# Patient Record
Sex: FEMALE | Race: WHITE | NOT HISPANIC OR LATINO | Employment: OTHER | URBAN - METROPOLITAN AREA
[De-identification: names, ages, dates, MRNs, and addresses within clinical notes are randomized per-mention and may not be internally consistent; named-entity substitution may affect disease eponyms.]

---

## 2023-02-04 PROBLEM — G89.29 CHRONIC RIGHT SHOULDER PAIN: Status: ACTIVE | Noted: 2023-02-04

## 2023-02-04 PROBLEM — R30.0 DYSURIA: Status: ACTIVE | Noted: 2023-02-04

## 2023-02-04 PROBLEM — F43.9 STRESS AT HOME: Status: ACTIVE | Noted: 2023-02-04

## 2023-02-04 PROBLEM — N20.0 KIDNEY STONE ON RIGHT SIDE: Status: ACTIVE | Noted: 2023-02-04

## 2023-02-04 PROBLEM — R35.0 URINARY FREQUENCY: Status: ACTIVE | Noted: 2023-02-04

## 2023-02-04 PROBLEM — H40.9 GLAUCOMA: Status: ACTIVE | Noted: 2023-02-04

## 2023-02-04 PROBLEM — E78.00 PURE HYPERCHOLESTEROLEMIA: Status: ACTIVE | Noted: 2023-02-04

## 2023-02-04 PROBLEM — E55.9 VITAMIN D DEFICIENCY: Status: ACTIVE | Noted: 2023-02-04

## 2023-02-04 PROBLEM — K59.00 CONSTIPATION: Status: ACTIVE | Noted: 2023-02-04

## 2023-02-04 PROBLEM — I10 HTN (HYPERTENSION): Status: ACTIVE | Noted: 2023-02-04

## 2023-02-04 PROBLEM — M25.551 RIGHT HIP PAIN: Status: ACTIVE | Noted: 2023-02-04

## 2023-02-04 PROBLEM — M19.90 ARTHRITIS: Status: ACTIVE | Noted: 2023-02-04

## 2023-02-04 PROBLEM — R53.1 WEAKNESS GENERALIZED: Status: ACTIVE | Noted: 2023-02-04

## 2023-02-04 PROBLEM — N13.30 HYDRONEPHROSIS OF RIGHT KIDNEY: Status: ACTIVE | Noted: 2023-02-04

## 2023-02-04 PROBLEM — M18.12 OSTEOARTHRITIS OF LEFT THUMB: Status: ACTIVE | Noted: 2023-02-04

## 2023-02-04 PROBLEM — E61.1 IRON DEFICIENCY: Status: ACTIVE | Noted: 2023-02-04

## 2023-02-04 PROBLEM — M25.511 CHRONIC RIGHT SHOULDER PAIN: Status: ACTIVE | Noted: 2023-02-04

## 2023-02-04 RX ORDER — MULTIVIT-MIN/FA/LYCOPEN/LUTEIN .4-300-25
1 TABLET ORAL DAILY
COMMUNITY

## 2023-02-04 RX ORDER — LATANOPROST 50 UG/ML
SOLUTION/ DROPS OPHTHALMIC
COMMUNITY
Start: 2018-04-26

## 2023-02-04 RX ORDER — GLUCOSAMINE/CHONDR SU A SOD 750-600 MG
TABLET ORAL
COMMUNITY

## 2023-02-04 RX ORDER — DOCUSATE SODIUM 100 MG/1
100 CAPSULE, LIQUID FILLED ORAL DAILY
COMMUNITY
Start: 2022-09-20

## 2023-02-04 RX ORDER — POLYETHYLENE GLYCOL 3350 17 G/17G
17 POWDER, FOR SOLUTION ORAL
COMMUNITY
Start: 2022-10-04

## 2023-02-04 RX ORDER — TIMOLOL MALEATE 5 MG/ML
1 SOLUTION/ DROPS OPHTHALMIC 2 TIMES DAILY
COMMUNITY
Start: 2021-06-23

## 2023-02-04 RX ORDER — AMLODIPINE BESYLATE 5 MG/1
5 TABLET ORAL DAILY
COMMUNITY
Start: 2019-02-13 | End: 2023-03-29 | Stop reason: SDUPTHER

## 2023-02-04 RX ORDER — CHOLECALCIFEROL (VITAMIN D3) 50 MCG
50 TABLET ORAL DAILY
COMMUNITY

## 2023-02-04 RX ORDER — LYSINE HCL 500 MG
TABLET ORAL
COMMUNITY

## 2023-02-04 RX ORDER — FERROUS SULFATE 325(65) MG
TABLET ORAL
COMMUNITY

## 2023-03-29 ENCOUNTER — TELEPHONE (OUTPATIENT)
Dept: PRIMARY CARE | Facility: CLINIC | Age: 83
End: 2023-03-29

## 2023-03-29 DIAGNOSIS — I10 PRIMARY HYPERTENSION: Primary | ICD-10-CM

## 2023-03-29 RX ORDER — AMLODIPINE BESYLATE 5 MG/1
5 TABLET ORAL DAILY
Qty: 90 TABLET | Refills: 3 | Status: SHIPPED | OUTPATIENT
Start: 2023-03-29 | End: 2023-04-17 | Stop reason: SDUPTHER

## 2023-03-29 NOTE — TELEPHONE ENCOUNTER
Patient requesting a 90 day refill of amlodipine besylate 5 mg sent to [facility] pharmacy on file.

## 2023-04-17 ENCOUNTER — OFFICE VISIT (OUTPATIENT)
Dept: PRIMARY CARE | Facility: CLINIC | Age: 83
End: 2023-04-17
Payer: MEDICARE

## 2023-04-17 VITALS
OXYGEN SATURATION: 93 % | DIASTOLIC BLOOD PRESSURE: 80 MMHG | BODY MASS INDEX: 22.26 KG/M2 | HEART RATE: 85 BPM | SYSTOLIC BLOOD PRESSURE: 110 MMHG | TEMPERATURE: 97.1 F | WEIGHT: 114 LBS

## 2023-04-17 DIAGNOSIS — M25.551 RIGHT HIP PAIN: ICD-10-CM

## 2023-04-17 DIAGNOSIS — M25.511 CHRONIC RIGHT SHOULDER PAIN: ICD-10-CM

## 2023-04-17 DIAGNOSIS — M16.11 PRIMARY OSTEOARTHRITIS OF RIGHT HIP: Primary | ICD-10-CM

## 2023-04-17 DIAGNOSIS — L20.89 OTHER ATOPIC DERMATITIS: ICD-10-CM

## 2023-04-17 DIAGNOSIS — G89.29 CHRONIC RIGHT SHOULDER PAIN: ICD-10-CM

## 2023-04-17 DIAGNOSIS — I10 PRIMARY HYPERTENSION: ICD-10-CM

## 2023-04-17 PROCEDURE — 1160F RVW MEDS BY RX/DR IN RCRD: CPT | Performed by: STUDENT IN AN ORGANIZED HEALTH CARE EDUCATION/TRAINING PROGRAM

## 2023-04-17 PROCEDURE — 3079F DIAST BP 80-89 MM HG: CPT | Performed by: STUDENT IN AN ORGANIZED HEALTH CARE EDUCATION/TRAINING PROGRAM

## 2023-04-17 PROCEDURE — 99213 OFFICE O/P EST LOW 20 MIN: CPT | Performed by: STUDENT IN AN ORGANIZED HEALTH CARE EDUCATION/TRAINING PROGRAM

## 2023-04-17 PROCEDURE — 1036F TOBACCO NON-USER: CPT | Performed by: STUDENT IN AN ORGANIZED HEALTH CARE EDUCATION/TRAINING PROGRAM

## 2023-04-17 PROCEDURE — 3074F SYST BP LT 130 MM HG: CPT | Performed by: STUDENT IN AN ORGANIZED HEALTH CARE EDUCATION/TRAINING PROGRAM

## 2023-04-17 PROCEDURE — 1159F MED LIST DOCD IN RCRD: CPT | Performed by: STUDENT IN AN ORGANIZED HEALTH CARE EDUCATION/TRAINING PROGRAM

## 2023-04-17 RX ORDER — AMLODIPINE BESYLATE 5 MG/1
5 TABLET ORAL DAILY
Qty: 90 TABLET | Refills: 3 | Status: SHIPPED | OUTPATIENT
Start: 2023-04-17 | End: 2023-05-22 | Stop reason: SDUPTHER

## 2023-04-17 RX ORDER — MAG HYDROX/ALUMINUM HYD/SIMETH 200-200-20
SUSPENSION, ORAL (FINAL DOSE FORM) ORAL 2 TIMES DAILY
Qty: 28 G | Refills: 1 | Status: SHIPPED | OUTPATIENT
Start: 2023-04-17 | End: 2023-08-21 | Stop reason: SDUPTHER

## 2023-04-17 RX ORDER — DICLOFENAC SODIUM 10 MG/G
4 GEL TOPICAL 4 TIMES DAILY PRN
Qty: 150 G | Refills: 2 | Status: SHIPPED | OUTPATIENT
Start: 2023-04-17

## 2023-04-17 ASSESSMENT — ENCOUNTER SYMPTOMS
GASTROINTESTINAL NEGATIVE: 1
ARTHRALGIAS: 1
RESPIRATORY NEGATIVE: 1
CONFUSION: 1
CONSTITUTIONAL NEGATIVE: 1
NEUROLOGICAL NEGATIVE: 1
CARDIOVASCULAR NEGATIVE: 1

## 2023-04-17 ASSESSMENT — PATIENT HEALTH QUESTIONNAIRE - PHQ9
2. FEELING DOWN, DEPRESSED OR HOPELESS: NOT AT ALL
1. LITTLE INTEREST OR PLEASURE IN DOING THINGS: NOT AT ALL
SUM OF ALL RESPONSES TO PHQ9 QUESTIONS 1 AND 2: 0

## 2023-04-17 NOTE — PROGRESS NOTES
Subjective   Patient ID: Glenny Dunlap is a 82 y.o. female who presents for Follow-up (4 month follow up).  Presents for follow up visit. She complains of arthritis pain in her hands. She states Tylenol and heat helps her pain.     She also complains of an itchy area of rough skin on her back. She scratches it with a scratcher, but she has not tried lotion.         Review of Systems   Constitutional: Negative.    Respiratory: Negative.     Cardiovascular: Negative.    Gastrointestinal: Negative.    Musculoskeletal:  Positive for arthralgias.   Skin:  Positive for rash.   Neurological: Negative.    Psychiatric/Behavioral:  Positive for confusion.        Objective   Physical Exam  Constitutional:       General: She is not in acute distress.     Appearance: She is not toxic-appearing.   HENT:      Head: Normocephalic and atraumatic.      Mouth/Throat:      Mouth: Mucous membranes are moist.   Eyes:      Conjunctiva/sclera: Conjunctivae normal.   Cardiovascular:      Rate and Rhythm: Normal rate and regular rhythm.      Pulses: Normal pulses.   Pulmonary:      Effort: Pulmonary effort is normal.      Breath sounds: Normal breath sounds.   Abdominal:      General: There is no distension.      Tenderness: There is no abdominal tenderness.   Musculoskeletal:         General: Swelling, tenderness and deformity present.      Comments: R hand with multiple joint deformities   Skin:     General: Skin is warm and dry.      Comments: Diffuse Xeroderma     Neurological:      General: No focal deficit present.      Mental Status: She is alert. Mental status is at baseline.   Psychiatric:         Mood and Affect: Mood normal.       Current Outpatient Medications:     latanoprost (Xalatan) 0.005 % ophthalmic solution, Administer into affected eye(s)., Disp: , Rfl:     timolol (Timoptic) 0.5 % ophthalmic solution, Administer 1 drop into affected eye(s) in the morning and 1 drop before bedtime., Disp: , Rfl:     amLODIPine  (Norvasc) 5 mg tablet, Take 1 tablet (5 mg) by mouth once daily., Disp: 90 tablet, Rfl: 3    biotin 2,500 mcg capsule, Take by mouth., Disp: , Rfl:     calcium carbonate-vit D3-min 600 mg calcium- 400 unit tablet, Take by mouth., Disp: , Rfl:     cholecalciferol (Vitamin D-3) 50 MCG (2000 UT) tablet, Take 1 tablet (50 mcg) by mouth in the morning., Disp: , Rfl:     diclofenac sodium (Voltaren) 1 % gel gel, Apply 1 Application topically 4 times a day as needed (joint pain)., Disp: 150 g, Rfl: 2    docusate sodium (Colace) 100 mg capsule, Take 1 capsule (100 mg) by mouth in the morning., Disp: , Rfl:     ferrous sulfate 325 (65 Fe) MG tablet, Take by mouth., Disp: , Rfl:     fish oil concentrate (Omega-3) 120-180 mg capsule, Take by mouth., Disp: , Rfl:     hydrocortisone 1 % ointment, Apply topically 2 times a day., Disp: 28 g, Rfl: 1    multivit-iron-minerals-folic acid (Centrum Silver) 0.4 mg-300 mcg- 250 mcg tab, Take 1 tablet by mouth in the morning., Disp: , Rfl:     polyethylene glycol (Glycolax) 17 gram/dose powder, Take 17 g by mouth., Disp: , Rfl:       Assessment/Plan   Diagnoses and all orders for this visit:  Primary osteoarthritis of right hip  Comments:  moderate to severe on Xray one year ago  recommended scheduled tylenol  Chronic right shoulder pain  -     diclofenac sodium (Voltaren) 1 % gel gel; Apply 1 Application topically 4 times a day as needed (joint pain).  Primary hypertension  -     amLODIPine (Norvasc) 5 mg tablet; Take 1 tablet (5 mg) by mouth once daily.  Other atopic dermatitis  -     hydrocortisone 1 % ointment; Apply topically 2 times a day.  Right hip pain    I saw and evaluated the patient. I personally obtained the key and critical portions of the history and physical exam or was physically present for key and critical portions performed by the trainee. I reviewed the trainee's documentation and discussed the patient with the trainee. I agree with the trainee's medical decision  making, as documented on the trainee's note.      Hannah Vale, DO

## 2023-05-01 ENCOUNTER — TELEPHONE (OUTPATIENT)
Dept: PRIMARY CARE | Facility: CLINIC | Age: 83
End: 2023-05-01

## 2023-05-01 NOTE — TELEPHONE ENCOUNTER
Glenny calling saying one of the more recent medications you prescribed for her she cannot afford and wasn't able to  - she said it was a gel .Im thinking it was the voltaren?   Is there something else you can recommend for her?

## 2023-05-22 DIAGNOSIS — I10 PRIMARY HYPERTENSION: ICD-10-CM

## 2023-05-22 RX ORDER — AMLODIPINE BESYLATE 5 MG/1
5 TABLET ORAL DAILY
Qty: 90 TABLET | Refills: 3 | Status: SHIPPED | OUTPATIENT
Start: 2023-05-22 | End: 2024-05-14 | Stop reason: SDUPTHER

## 2023-08-21 ENCOUNTER — OFFICE VISIT (OUTPATIENT)
Dept: PRIMARY CARE | Facility: CLINIC | Age: 83
End: 2023-08-21
Payer: COMMERCIAL

## 2023-08-21 VITALS
BODY MASS INDEX: 21.79 KG/M2 | OXYGEN SATURATION: 98 % | DIASTOLIC BLOOD PRESSURE: 62 MMHG | WEIGHT: 111 LBS | HEIGHT: 60 IN | SYSTOLIC BLOOD PRESSURE: 130 MMHG | HEART RATE: 68 BPM

## 2023-08-21 DIAGNOSIS — E55.9 VITAMIN D DEFICIENCY: ICD-10-CM

## 2023-08-21 DIAGNOSIS — Z00.00 MEDICARE ANNUAL WELLNESS VISIT, SUBSEQUENT: Primary | ICD-10-CM

## 2023-08-21 DIAGNOSIS — Z12.11 ENCOUNTER FOR SCREENING FOR MALIGNANT NEOPLASM OF COLON: ICD-10-CM

## 2023-08-21 DIAGNOSIS — L20.89 OTHER ATOPIC DERMATITIS: ICD-10-CM

## 2023-08-21 DIAGNOSIS — R19.5 POSITIVE COLORECTAL CANCER SCREENING USING COLOGUARD TEST: ICD-10-CM

## 2023-08-21 DIAGNOSIS — I10 PRIMARY HYPERTENSION: ICD-10-CM

## 2023-08-21 DIAGNOSIS — J30.1 SEASONAL ALLERGIC RHINITIS DUE TO POLLEN: ICD-10-CM

## 2023-08-21 PROCEDURE — 1159F MED LIST DOCD IN RCRD: CPT | Performed by: STUDENT IN AN ORGANIZED HEALTH CARE EDUCATION/TRAINING PROGRAM

## 2023-08-21 PROCEDURE — 3078F DIAST BP <80 MM HG: CPT | Performed by: STUDENT IN AN ORGANIZED HEALTH CARE EDUCATION/TRAINING PROGRAM

## 2023-08-21 PROCEDURE — 1170F FXNL STATUS ASSESSED: CPT | Performed by: STUDENT IN AN ORGANIZED HEALTH CARE EDUCATION/TRAINING PROGRAM

## 2023-08-21 PROCEDURE — G0439 PPPS, SUBSEQ VISIT: HCPCS | Performed by: STUDENT IN AN ORGANIZED HEALTH CARE EDUCATION/TRAINING PROGRAM

## 2023-08-21 PROCEDURE — 99213 OFFICE O/P EST LOW 20 MIN: CPT | Performed by: STUDENT IN AN ORGANIZED HEALTH CARE EDUCATION/TRAINING PROGRAM

## 2023-08-21 PROCEDURE — 1036F TOBACCO NON-USER: CPT | Performed by: STUDENT IN AN ORGANIZED HEALTH CARE EDUCATION/TRAINING PROGRAM

## 2023-08-21 PROCEDURE — 3075F SYST BP GE 130 - 139MM HG: CPT | Performed by: STUDENT IN AN ORGANIZED HEALTH CARE EDUCATION/TRAINING PROGRAM

## 2023-08-21 PROCEDURE — 1160F RVW MEDS BY RX/DR IN RCRD: CPT | Performed by: STUDENT IN AN ORGANIZED HEALTH CARE EDUCATION/TRAINING PROGRAM

## 2023-08-21 PROCEDURE — 1124F ACP DISCUSS-NO DSCNMKR DOCD: CPT | Performed by: STUDENT IN AN ORGANIZED HEALTH CARE EDUCATION/TRAINING PROGRAM

## 2023-08-21 RX ORDER — MAG HYDROX/ALUMINUM HYD/SIMETH 200-200-20
SUSPENSION, ORAL (FINAL DOSE FORM) ORAL 2 TIMES DAILY
Qty: 28 G | Refills: 1 | Status: SHIPPED | OUTPATIENT
Start: 2023-08-21 | End: 2023-12-04

## 2023-08-21 ASSESSMENT — ENCOUNTER SYMPTOMS
NEUROLOGICAL NEGATIVE: 1
ARTHRALGIAS: 1
RESPIRATORY NEGATIVE: 1
CARDIOVASCULAR NEGATIVE: 1
GASTROINTESTINAL NEGATIVE: 1
CONSTITUTIONAL NEGATIVE: 1

## 2023-08-21 ASSESSMENT — ACTIVITIES OF DAILY LIVING (ADL)
BATHING: INDEPENDENT
DRESSING: INDEPENDENT
MANAGING_FINANCES: INDEPENDENT
GROCERY_SHOPPING: NEEDS ASSISTANCE
TAKING_MEDICATION: INDEPENDENT
DOING_HOUSEWORK: INDEPENDENT

## 2023-08-21 ASSESSMENT — PATIENT HEALTH QUESTIONNAIRE - PHQ9
2. FEELING DOWN, DEPRESSED OR HOPELESS: NOT AT ALL
SUM OF ALL RESPONSES TO PHQ9 QUESTIONS 1 AND 2: 0
1. LITTLE INTEREST OR PLEASURE IN DOING THINGS: NOT AT ALL

## 2023-08-21 NOTE — PROGRESS NOTES
Subjective   Patient ID: Glenny Dunlap is a 83 y.o. female who presents for Medicare Annual Wellness Visit Subsequent (Medicare annual wellness/Possible sinus infection, nasal drainage and ears plugged/Would like to do a cologuard, got a letter in the mail that she's due).  Glenny Dunlap is an 83 year old female who presents today for her Medicare Annual Wellness visit. She reports arthritic pain however states pain is tolerable with topicals, Tylenol, and knee bracing. She is interested in cologuard cancer screening at this time. She otherwise denies any other complaints at this time.     No recent kidney stones. Bowels doing well.         Review of Systems   Constitutional: Negative.    HENT: Negative.     Respiratory: Negative.     Cardiovascular: Negative.    Gastrointestinal: Negative.    Genitourinary: Negative.    Musculoskeletal:  Positive for arthralgias.   Skin: Negative.    Neurological: Negative.        Objective   Physical Exam  Constitutional:       Appearance: Normal appearance. She is normal weight.   HENT:      Mouth/Throat:      Mouth: Mucous membranes are moist.      Pharynx: Oropharynx is clear.   Eyes:      Extraocular Movements: Extraocular movements intact.      Conjunctiva/sclera: Conjunctivae normal.      Pupils: Pupils are equal, round, and reactive to light.   Cardiovascular:      Pulses: Normal pulses.      Heart sounds: Normal heart sounds.   Pulmonary:      Effort: Pulmonary effort is normal.      Breath sounds: Normal breath sounds.   Abdominal:      General: Abdomen is flat.      Palpations: Abdomen is soft.   Musculoskeletal:         General: Normal range of motion.   Skin:     General: Skin is dry.   Neurological:      General: No focal deficit present.      Mental Status: She is oriented to person, place, and time. Mental status is at baseline.           Current Outpatient Medications:     amLODIPine (Norvasc) 5 mg tablet, Take 1 tablet (5 mg) by mouth once daily.,  Disp: 90 tablet, Rfl: 3    biotin 2,500 mcg capsule, Take by mouth., Disp: , Rfl:     calcium carbonate-vit D3-min 600 mg calcium- 400 unit tablet, Take by mouth., Disp: , Rfl:     cholecalciferol (Vitamin D-3) 50 MCG (2000 UT) tablet, Take 1 tablet (50 mcg) by mouth in the morning., Disp: , Rfl:     diclofenac sodium (Voltaren) 1 % gel gel, Apply 1 Application topically 4 times a day as needed (joint pain)., Disp: 150 g, Rfl: 2    docusate sodium (Colace) 100 mg capsule, Take 1 capsule (100 mg) by mouth in the morning., Disp: , Rfl:     ferrous sulfate 325 (65 Fe) MG tablet, Take by mouth., Disp: , Rfl:     fish oil concentrate (Omega-3) 120-180 mg capsule, Take by mouth., Disp: , Rfl:     hydrocortisone 1 % ointment, Apply topically 2 times a day., Disp: 28 g, Rfl: 1    latanoprost (Xalatan) 0.005 % ophthalmic solution, Administer into affected eye(s)., Disp: , Rfl:     multivit-iron-minerals-folic acid (Centrum Silver) 0.4 mg-300 mcg- 250 mcg tab, Take 1 tablet by mouth in the morning., Disp: , Rfl:     polyethylene glycol (Glycolax) 17 gram/dose powder, Take 17 g by mouth., Disp: , Rfl:     timolol (Timoptic) 0.5 % ophthalmic solution, Administer 1 drop into affected eye(s) in the morning and 1 drop before bedtime., Disp: , Rfl:     Assessment/Plan   Diagnoses and all orders for this visit:  Medicare annual wellness visit, subsequent  Comments:  declines DEXA  declines pneumonia and flu vaccines  Vitamin D deficiency  Encounter for screening for malignant neoplasm of colon  -     Cologuard® colon cancer screening; Future  Primary hypertension  Comments:  well controlled    Follow up in 3 months    I saw and evaluated the patient. I personally obtained the key and critical portions of the history and physical exam or was physically present for key and critical portions performed by the trainee. I reviewed the trainee's documentation and discussed the patient with the trainee. I agree with the trainee's medical  decision making, as documented on the trainee's note.      Hannah Vale, DO

## 2023-08-21 NOTE — TELEPHONE ENCOUNTER
Pt was given Oxymetazoline in hospital for her nose issues and is now requesting a refill.  I believe you explained to patient that the above medication if OTC Afrin and you would not recommend using this all the time.  Patient is wondering what else she could use and could you order it?

## 2023-09-06 LAB — NONINV COLON CA DNA+OCC BLD SCRN STL QL: POSITIVE

## 2023-09-08 RX ORDER — AZELASTINE 1 MG/ML
1 SPRAY, METERED NASAL 2 TIMES DAILY
Qty: 30 ML | Refills: 1 | Status: SHIPPED | OUTPATIENT
Start: 2023-09-08 | End: 2024-09-07

## 2023-12-04 DIAGNOSIS — L20.89 OTHER ATOPIC DERMATITIS: ICD-10-CM

## 2023-12-04 RX ORDER — MAG HYDROX/ALUMINUM HYD/SIMETH 200-200-20
SUSPENSION, ORAL (FINAL DOSE FORM) ORAL 2 TIMES DAILY
Qty: 28 G | Refills: 3 | Status: SHIPPED | OUTPATIENT
Start: 2023-12-04 | End: 2024-05-14 | Stop reason: SDUPTHER

## 2023-12-12 ENCOUNTER — OFFICE VISIT (OUTPATIENT)
Dept: PRIMARY CARE | Facility: CLINIC | Age: 83
End: 2023-12-12
Payer: MEDICARE

## 2023-12-12 VITALS
WEIGHT: 113 LBS | HEART RATE: 62 BPM | OXYGEN SATURATION: 97 % | DIASTOLIC BLOOD PRESSURE: 82 MMHG | SYSTOLIC BLOOD PRESSURE: 142 MMHG | BODY MASS INDEX: 22.07 KG/M2

## 2023-12-12 DIAGNOSIS — M25.511 CHRONIC RIGHT SHOULDER PAIN: ICD-10-CM

## 2023-12-12 DIAGNOSIS — I10 PRIMARY HYPERTENSION: Primary | ICD-10-CM

## 2023-12-12 DIAGNOSIS — L20.89 OTHER ATOPIC DERMATITIS: ICD-10-CM

## 2023-12-12 DIAGNOSIS — E55.9 VITAMIN D DEFICIENCY: ICD-10-CM

## 2023-12-12 DIAGNOSIS — R19.5 POSITIVE COLORECTAL CANCER SCREENING USING COLOGUARD TEST: ICD-10-CM

## 2023-12-12 DIAGNOSIS — M16.11 PRIMARY OSTEOARTHRITIS OF RIGHT HIP: ICD-10-CM

## 2023-12-12 DIAGNOSIS — G89.29 CHRONIC RIGHT SHOULDER PAIN: ICD-10-CM

## 2023-12-12 PROCEDURE — 1160F RVW MEDS BY RX/DR IN RCRD: CPT | Performed by: STUDENT IN AN ORGANIZED HEALTH CARE EDUCATION/TRAINING PROGRAM

## 2023-12-12 PROCEDURE — 1036F TOBACCO NON-USER: CPT | Performed by: STUDENT IN AN ORGANIZED HEALTH CARE EDUCATION/TRAINING PROGRAM

## 2023-12-12 PROCEDURE — 99213 OFFICE O/P EST LOW 20 MIN: CPT | Performed by: STUDENT IN AN ORGANIZED HEALTH CARE EDUCATION/TRAINING PROGRAM

## 2023-12-12 PROCEDURE — 3079F DIAST BP 80-89 MM HG: CPT | Performed by: STUDENT IN AN ORGANIZED HEALTH CARE EDUCATION/TRAINING PROGRAM

## 2023-12-12 PROCEDURE — 1159F MED LIST DOCD IN RCRD: CPT | Performed by: STUDENT IN AN ORGANIZED HEALTH CARE EDUCATION/TRAINING PROGRAM

## 2023-12-12 PROCEDURE — 3077F SYST BP >= 140 MM HG: CPT | Performed by: STUDENT IN AN ORGANIZED HEALTH CARE EDUCATION/TRAINING PROGRAM

## 2023-12-12 ASSESSMENT — PATIENT HEALTH QUESTIONNAIRE - PHQ9
SUM OF ALL RESPONSES TO PHQ9 QUESTIONS 1 AND 2: 0
1. LITTLE INTEREST OR PLEASURE IN DOING THINGS: NOT AT ALL
2. FEELING DOWN, DEPRESSED OR HOPELESS: NOT AT ALL

## 2023-12-12 NOTE — PROGRESS NOTES
Subjective   Patient ID: Glenny Dunlap is a 83 y.o. female who presents for a follow up visit.    HPI   82 yo F with a PMHx of primary R Hip OA, Vit D deficiency, HTN, Ch R shoulder pain, atopic dermatitis.  Pt denies any ac complaints including headache, blurry vision, chest pain, SOB, palpitations, changes in bowel or bladder habits, or falls.  Denies any hematochezia or melena, no weight noss, appette is normal. Denies any FH of colon Ca.  Last visit: 8/21/23  Last Labs :10/22  Cologuard was positive in 8/30 and a colonoscopy has been ordered  Pt declined Influenza, pneumonia vaccines and DEXA.    She lives with her son and his children. Is functionally independent. Has railings in her house. She denies nay smoking/alcohol/illict drug use.    Review of Systems  CONSTITUTIONAL: Denies fever; reports chills  NEURO: Denies difficulty walking, numbness, weakness, tingling, headache.   HEENT: Denies sore throat, rhinorrhea, epistaxis, changes in vision.   CARDIO: Denies chest pain, palpitations  PULM: Denies shortness of breath, cough.   GI: Denies abdominal pain, nausea, diarrhea, vomiting, melena, hematochezia.  : Denies painful urination.   MSK: Denies edema; Denies leg swelling  SKIN: Denies rash, lesions.   ENDOCRINE: Denies unexpected weight-loss.   HEME: Denies bleeding disorder.       Objective   /82   Pulse 62   Wt 51.3 kg (113 lb)   SpO2 97%   BMI 22.07 kg/m²     Physical Exam  Constitutional: Well developed,  well appearing adult in no acute distress.   NEURO: Alert and oriented. Moves all extremities. Face is symmetric and expressive. Sensation is intact over all dermatomal distributions of the right upper extremity. Strength is intact  EYES: PERRL. No scleral icterus or conjunctival injection. No discharge.   HENT: Normocephalic, atraumatic. Hearing is grossly intact. Nares grossly patent and without discharge. Mucous membranes moist.   NECK: No JVD. Patient moves neck without  restriction.   CARDIO: Rhythm regular. Normal rate. No murmur, rub, or gallop. Pulses equal bilaterally in the upper and lower extremity.   PULM: Lungs clear to auscultation in all fields. No wheezes, rales, or rhonchi. No conversational dyspnea. No splinting, stridor, or accessory muscle use.    GI/: Abdomen is soft and non-tender. Normoactive bowel sounds.   EXTREMITIES: No clubbing, cyanosis, or deformity. No lower extremity edema. No tenderness along the deep venous distribution of the bilateral lower extremities  SKIN: Warm and dry. Normal turgor. No rash noted over the area of pain  PSYCH: Mood, affect, and interaction is appropriate to the setting.      Assessment/Plan   Diagnoses and all orders for this visit:  Primary hypertension  Comments:  Stable, continue current medications  Chronic right shoulder pain  Comments:  Secondary to OA. Is better with Tylenol and topic NSAIDs  Primary osteoarthritis of right hip  Comments:  Pain is relieved with Tylenol and Topical NSAIDS. Has to use a walker only rarely whwnever the pain is worse like in cold weather.  Positive colorectal cancer screening using Cologuard test  Comments:  A colonoscopy order has been placed. Counseled extensively upon the need for a colonoscopy in the setting of a positive cologuard test.  Other atopic dermatitis  Comments:  Relieved with topic steroids as needed.  Vitamin D deficiency  Comments:  Stable, pt is on Vit D and Calcium    Follow up in 6 months       I saw and evaluated the patient. I personally obtained the key and critical portions of the history and physical exam or was physically present for key and critical portions performed by the trainee. I reviewed the trainee's documentation and discussed the patient with the trainee. I agree with the trainee's medical decision making, as documented on the trainee's note.      Hannah Vale, DO

## 2023-12-18 ENCOUNTER — APPOINTMENT (OUTPATIENT)
Dept: PRIMARY CARE | Facility: CLINIC | Age: 83
End: 2023-12-18
Payer: MEDICARE

## 2023-12-20 ENCOUNTER — APPOINTMENT (OUTPATIENT)
Dept: PRIMARY CARE | Facility: CLINIC | Age: 83
End: 2023-12-20
Payer: MEDICARE

## 2024-05-14 ENCOUNTER — OFFICE VISIT (OUTPATIENT)
Dept: PRIMARY CARE | Facility: CLINIC | Age: 84
End: 2024-05-14
Payer: MEDICARE

## 2024-05-14 VITALS
SYSTOLIC BLOOD PRESSURE: 130 MMHG | BODY MASS INDEX: 21.79 KG/M2 | WEIGHT: 111 LBS | OXYGEN SATURATION: 97 % | HEIGHT: 60 IN | HEART RATE: 67 BPM | DIASTOLIC BLOOD PRESSURE: 68 MMHG

## 2024-05-14 DIAGNOSIS — M25.551 RIGHT HIP PAIN: Chronic | ICD-10-CM

## 2024-05-14 DIAGNOSIS — E78.00 PURE HYPERCHOLESTEROLEMIA: ICD-10-CM

## 2024-05-14 DIAGNOSIS — L60.8 CHANGE IN NAIL APPEARANCE: ICD-10-CM

## 2024-05-14 DIAGNOSIS — G89.29 CHRONIC RIGHT SHOULDER PAIN: ICD-10-CM

## 2024-05-14 DIAGNOSIS — L20.89 OTHER ATOPIC DERMATITIS: ICD-10-CM

## 2024-05-14 DIAGNOSIS — E55.9 VITAMIN D DEFICIENCY: ICD-10-CM

## 2024-05-14 DIAGNOSIS — R73.9 HYPERGLYCEMIA: ICD-10-CM

## 2024-05-14 DIAGNOSIS — Z00.00 MEDICARE ANNUAL WELLNESS VISIT, SUBSEQUENT: Primary | ICD-10-CM

## 2024-05-14 DIAGNOSIS — I10 PRIMARY HYPERTENSION: ICD-10-CM

## 2024-05-14 DIAGNOSIS — Z13.29 THYROID DISORDER SCREENING: ICD-10-CM

## 2024-05-14 DIAGNOSIS — M25.511 CHRONIC RIGHT SHOULDER PAIN: ICD-10-CM

## 2024-05-14 LAB
25(OH)D3 SERPL-MCNC: 75 NG/ML (ref 30–100)
ALBUMIN SERPL BCP-MCNC: 4.4 G/DL (ref 3.4–5)
ALP SERPL-CCNC: 79 U/L (ref 33–136)
ALT SERPL W P-5'-P-CCNC: 16 U/L (ref 7–45)
ANION GAP SERPL CALC-SCNC: 14 MMOL/L (ref 10–20)
AST SERPL W P-5'-P-CCNC: 20 U/L (ref 9–39)
BILIRUB SERPL-MCNC: 0.6 MG/DL (ref 0–1.2)
BUN SERPL-MCNC: 21 MG/DL (ref 6–23)
CALCIUM SERPL-MCNC: 9.4 MG/DL (ref 8.6–10.6)
CHLORIDE SERPL-SCNC: 106 MMOL/L (ref 98–107)
CHOLEST SERPL-MCNC: 271 MG/DL (ref 0–199)
CHOLESTEROL/HDL RATIO: 4.5
CO2 SERPL-SCNC: 25 MMOL/L (ref 21–32)
CREAT SERPL-MCNC: 0.69 MG/DL (ref 0.5–1.05)
EGFRCR SERPLBLD CKD-EPI 2021: 87 ML/MIN/1.73M*2
ERYTHROCYTE [DISTWIDTH] IN BLOOD BY AUTOMATED COUNT: 12.7 % (ref 11.5–14.5)
EST. AVERAGE GLUCOSE BLD GHB EST-MCNC: 111 MG/DL
GLUCOSE SERPL-MCNC: 84 MG/DL (ref 74–99)
HBA1C MFR BLD: 5.5 %
HCT VFR BLD AUTO: 43.7 % (ref 36–46)
HDLC SERPL-MCNC: 59.7 MG/DL
HGB BLD-MCNC: 13.7 G/DL (ref 12–16)
LDLC SERPL CALC-MCNC: 181 MG/DL
MCH RBC QN AUTO: 29.7 PG (ref 26–34)
MCHC RBC AUTO-ENTMCNC: 31.4 G/DL (ref 32–36)
MCV RBC AUTO: 95 FL (ref 80–100)
NON HDL CHOLESTEROL: 211 MG/DL (ref 0–149)
NRBC BLD-RTO: 0 /100 WBCS (ref 0–0)
PLATELET # BLD AUTO: 306 X10*3/UL (ref 150–450)
POTASSIUM SERPL-SCNC: 4.4 MMOL/L (ref 3.5–5.3)
PROT SERPL-MCNC: 7.9 G/DL (ref 6.4–8.2)
RBC # BLD AUTO: 4.61 X10*6/UL (ref 4–5.2)
SODIUM SERPL-SCNC: 141 MMOL/L (ref 136–145)
TRIGL SERPL-MCNC: 154 MG/DL (ref 0–149)
TSH SERPL-ACNC: 1.29 MIU/L (ref 0.44–3.98)
VLDL: 31 MG/DL (ref 0–40)
WBC # BLD AUTO: 6.5 X10*3/UL (ref 4.4–11.3)

## 2024-05-14 PROCEDURE — 80061 LIPID PANEL: CPT

## 2024-05-14 PROCEDURE — 1160F RVW MEDS BY RX/DR IN RCRD: CPT | Performed by: STUDENT IN AN ORGANIZED HEALTH CARE EDUCATION/TRAINING PROGRAM

## 2024-05-14 PROCEDURE — 1036F TOBACCO NON-USER: CPT | Performed by: STUDENT IN AN ORGANIZED HEALTH CARE EDUCATION/TRAINING PROGRAM

## 2024-05-14 PROCEDURE — 84443 ASSAY THYROID STIM HORMONE: CPT

## 2024-05-14 PROCEDURE — 3078F DIAST BP <80 MM HG: CPT | Performed by: STUDENT IN AN ORGANIZED HEALTH CARE EDUCATION/TRAINING PROGRAM

## 2024-05-14 PROCEDURE — 80053 COMPREHEN METABOLIC PANEL: CPT

## 2024-05-14 PROCEDURE — 85027 COMPLETE CBC AUTOMATED: CPT

## 2024-05-14 PROCEDURE — 99214 OFFICE O/P EST MOD 30 MIN: CPT | Performed by: STUDENT IN AN ORGANIZED HEALTH CARE EDUCATION/TRAINING PROGRAM

## 2024-05-14 PROCEDURE — 36415 COLL VENOUS BLD VENIPUNCTURE: CPT

## 2024-05-14 PROCEDURE — 83036 HEMOGLOBIN GLYCOSYLATED A1C: CPT

## 2024-05-14 PROCEDURE — G0439 PPPS, SUBSEQ VISIT: HCPCS | Performed by: STUDENT IN AN ORGANIZED HEALTH CARE EDUCATION/TRAINING PROGRAM

## 2024-05-14 PROCEDURE — 82306 VITAMIN D 25 HYDROXY: CPT

## 2024-05-14 PROCEDURE — 3075F SYST BP GE 130 - 139MM HG: CPT | Performed by: STUDENT IN AN ORGANIZED HEALTH CARE EDUCATION/TRAINING PROGRAM

## 2024-05-14 PROCEDURE — 1124F ACP DISCUSS-NO DSCNMKR DOCD: CPT | Performed by: STUDENT IN AN ORGANIZED HEALTH CARE EDUCATION/TRAINING PROGRAM

## 2024-05-14 PROCEDURE — 1170F FXNL STATUS ASSESSED: CPT | Performed by: STUDENT IN AN ORGANIZED HEALTH CARE EDUCATION/TRAINING PROGRAM

## 2024-05-14 PROCEDURE — 1159F MED LIST DOCD IN RCRD: CPT | Performed by: STUDENT IN AN ORGANIZED HEALTH CARE EDUCATION/TRAINING PROGRAM

## 2024-05-14 RX ORDER — AMLODIPINE BESYLATE 5 MG/1
5 TABLET ORAL DAILY
Qty: 90 TABLET | Refills: 3 | Status: SHIPPED | OUTPATIENT
Start: 2024-05-14

## 2024-05-14 RX ORDER — MAG HYDROX/ALUMINUM HYD/SIMETH 200-200-20
SUSPENSION, ORAL (FINAL DOSE FORM) ORAL 2 TIMES DAILY
Qty: 28 G | Refills: 3 | Status: SHIPPED | OUTPATIENT
Start: 2024-05-14

## 2024-05-14 ASSESSMENT — ACTIVITIES OF DAILY LIVING (ADL)
BATHING: INDEPENDENT
MANAGING_FINANCES: INDEPENDENT
DRESSING: INDEPENDENT
DOING_HOUSEWORK: INDEPENDENT
GROCERY_SHOPPING: INDEPENDENT
TAKING_MEDICATION: INDEPENDENT

## 2024-05-14 ASSESSMENT — ENCOUNTER SYMPTOMS
SHORTNESS OF BREATH: 0
LIGHT-HEADEDNESS: 0
UNEXPECTED WEIGHT CHANGE: 0
WHEEZING: 0
SLEEP DISTURBANCE: 0
ABDOMINAL PAIN: 0
SINUS PRESSURE: 0
ARTHRALGIAS: 1
DIZZINESS: 0
NERVOUS/ANXIOUS: 0
HEADACHES: 0
FATIGUE: 0
DIFFICULTY URINATING: 0
CHEST TIGHTNESS: 0
PALPITATIONS: 0
DYSPHORIC MOOD: 0
CONSTIPATION: 0
DIARRHEA: 0

## 2024-05-14 ASSESSMENT — PATIENT HEALTH QUESTIONNAIRE - PHQ9
2. FEELING DOWN, DEPRESSED OR HOPELESS: NOT AT ALL
SUM OF ALL RESPONSES TO PHQ9 QUESTIONS 1 AND 2: 0
2. FEELING DOWN, DEPRESSED OR HOPELESS: NOT AT ALL
1. LITTLE INTEREST OR PLEASURE IN DOING THINGS: NOT AT ALL
1. LITTLE INTEREST OR PLEASURE IN DOING THINGS: NOT AT ALL
SUM OF ALL RESPONSES TO PHQ9 QUESTIONS 1 AND 2: 0

## 2024-05-14 NOTE — PROGRESS NOTES
Subjective   Patient ID: Glenny Dunlap is a 83 y.o. female who presents for Medicare Annual Wellness Visit Subsequent (Wellness visit /Everyday arthritis pain /She is spring cleaning and moving around a lot- likes to stay on the move ).  Complains of chronic joint pains. Right hip and shoulder. States no worse than usual. If she rests it gets better.     Had to put her dog to sleep 2 weeks ago. Is grieving.     Her appetite has been good.     Up to date on eye exams.     Rare diarrhea.     Her one son lives with her.     Does a lot of gardening and baking. Stays active.     Declines bone density scan.     Declines vaccines.     No other concerns today.                 Review of Systems   Constitutional:  Negative for fatigue and unexpected weight change.   HENT:  Negative for congestion, ear pain and sinus pressure.    Eyes:  Negative for visual disturbance.   Respiratory:  Negative for chest tightness, shortness of breath and wheezing.    Cardiovascular:  Negative for chest pain, palpitations and leg swelling.   Gastrointestinal:  Negative for abdominal pain, constipation and diarrhea.   Genitourinary:  Negative for difficulty urinating.   Musculoskeletal:  Positive for arthralgias.   Skin:  Negative for rash.   Neurological:  Negative for dizziness, light-headedness and headaches.   Psychiatric/Behavioral:  Negative for dysphoric mood and sleep disturbance. The patient is not nervous/anxious.    All other systems reviewed and are negative.      Objective   Physical Exam  Vitals reviewed.   Constitutional:       General: She is not in acute distress.  HENT:      Head: Normocephalic.      Right Ear: External ear normal.      Left Ear: External ear normal.      Nose: Nose normal.   Eyes:      Extraocular Movements: Extraocular movements intact.      Pupils: Pupils are equal, round, and reactive to light.   Cardiovascular:      Rate and Rhythm: Normal rate and regular rhythm.      Heart sounds: Normal heart  sounds.   Pulmonary:      Effort: Pulmonary effort is normal.      Breath sounds: Normal breath sounds.   Abdominal:      Palpations: Abdomen is soft.      Tenderness: There is no abdominal tenderness. There is no guarding.   Musculoskeletal:         General: Normal range of motion.      Cervical back: Normal range of motion and neck supple.   Skin:     General: Skin is warm and dry.   Neurological:      Mental Status: She is alert. Mental status is at baseline.   Psychiatric:         Mood and Affect: Mood normal.         Behavior: Behavior normal.         Body mass index is 21.68 kg/m².      Current Outpatient Medications:     azelastine (Astelin) 137 mcg (0.1 %) nasal spray, Administer 1 spray into each nostril 2 times a day. Use in each nostril as directed, Disp: 30 mL, Rfl: 1    biotin 2,500 mcg capsule, Take by mouth., Disp: , Rfl:     calcium carbonate-vit D3-min 600 mg calcium- 400 unit tablet, Take by mouth., Disp: , Rfl:     cholecalciferol (Vitamin D-3) 50 MCG (2000 UT) tablet, Take 1 tablet (50 mcg) by mouth once daily., Disp: , Rfl:     diclofenac sodium (Voltaren) 1 % gel gel, Apply 1 Application topically 4 times a day as needed (joint pain)., Disp: 150 g, Rfl: 2    docusate sodium (Colace) 100 mg capsule, Take 1 capsule (100 mg) by mouth once daily., Disp: , Rfl:     ferrous sulfate 325 (65 Fe) MG tablet, Take by mouth., Disp: , Rfl:     fish oil concentrate (Omega-3) 120-180 mg capsule, Take by mouth., Disp: , Rfl:     latanoprost (Xalatan) 0.005 % ophthalmic solution, Administer into affected eye(s)., Disp: , Rfl:     multivit-iron-minerals-folic acid (Centrum Silver) 0.4 mg-300 mcg- 250 mcg tab, Take 1 tablet by mouth once daily., Disp: , Rfl:     polyethylene glycol (Glycolax) 17 gram/dose powder, Take 17 g by mouth., Disp: , Rfl:     timolol (Timoptic) 0.5 % ophthalmic solution, Administer 1 drop into affected eye(s) 2 times a day., Disp: , Rfl:     amLODIPine (Norvasc) 5 mg tablet, Take 1 tablet  (5 mg) by mouth once daily., Disp: 90 tablet, Rfl: 3    hydrocortisone 1 % ointment, Apply topically 2 times a day., Disp: 28 g, Rfl: 3      Assessment/Plan   Diagnoses and all orders for this visit:  Medicare annual wellness visit, subsequent  Comments:  declines vaccines  declines bone density exam  Other atopic dermatitis  -     hydrocortisone 1 % ointment; Apply topically 2 times a day.  Primary hypertension  Comments:  well controlled  Orders:  -     amLODIPine (Norvasc) 5 mg tablet; Take 1 tablet (5 mg) by mouth once daily.  -     Comprehensive Metabolic Panel  Vitamin D deficiency  -     Vitamin D 25-Hydroxy,Total (for eval of Vitamin D levels)  Right hip pain  Comments:  unchanged, rests and will resolve  Pure hypercholesterolemia  -     Lipid Panel  -     CBC  Chronic right shoulder pain  Comments:  chronic, unchanged  Thyroid disorder screening  -     TSH with reflex to Free T4 if abnormal  Hyperglycemia  -     Hemoglobin A1c  Change in nail appearance  Comments:  taking biotin    Follow up in 6 months       Hannah Vale DO 05/14/24 9:28 AM

## 2024-11-18 ENCOUNTER — APPOINTMENT (OUTPATIENT)
Dept: PRIMARY CARE | Facility: CLINIC | Age: 84
End: 2024-11-18
Payer: MEDICARE

## 2024-11-18 VITALS
BODY MASS INDEX: 21.79 KG/M2 | SYSTOLIC BLOOD PRESSURE: 140 MMHG | HEIGHT: 60 IN | WEIGHT: 111 LBS | OXYGEN SATURATION: 95 % | HEART RATE: 104 BPM | DIASTOLIC BLOOD PRESSURE: 70 MMHG

## 2024-11-18 DIAGNOSIS — M25.551 RIGHT HIP PAIN: Primary | Chronic | ICD-10-CM

## 2024-11-18 DIAGNOSIS — I10 PRIMARY HYPERTENSION: Chronic | ICD-10-CM

## 2024-11-18 PROCEDURE — 3077F SYST BP >= 140 MM HG: CPT | Performed by: STUDENT IN AN ORGANIZED HEALTH CARE EDUCATION/TRAINING PROGRAM

## 2024-11-18 PROCEDURE — 99213 OFFICE O/P EST LOW 20 MIN: CPT | Performed by: STUDENT IN AN ORGANIZED HEALTH CARE EDUCATION/TRAINING PROGRAM

## 2024-11-18 PROCEDURE — 1160F RVW MEDS BY RX/DR IN RCRD: CPT | Performed by: STUDENT IN AN ORGANIZED HEALTH CARE EDUCATION/TRAINING PROGRAM

## 2024-11-18 PROCEDURE — 1036F TOBACCO NON-USER: CPT | Performed by: STUDENT IN AN ORGANIZED HEALTH CARE EDUCATION/TRAINING PROGRAM

## 2024-11-18 PROCEDURE — 1159F MED LIST DOCD IN RCRD: CPT | Performed by: STUDENT IN AN ORGANIZED HEALTH CARE EDUCATION/TRAINING PROGRAM

## 2024-11-18 PROCEDURE — 3078F DIAST BP <80 MM HG: CPT | Performed by: STUDENT IN AN ORGANIZED HEALTH CARE EDUCATION/TRAINING PROGRAM

## 2024-11-18 ASSESSMENT — ENCOUNTER SYMPTOMS
SHORTNESS OF BREATH: 0
DIARRHEA: 0
CHEST TIGHTNESS: 0
ABDOMINAL PAIN: 0
SINUS PRESSURE: 0
ARTHRALGIAS: 1
CONSTIPATION: 0
HEADACHES: 0
DIZZINESS: 0
DYSPHORIC MOOD: 0
NERVOUS/ANXIOUS: 0
DIFFICULTY URINATING: 0
FATIGUE: 0
UNEXPECTED WEIGHT CHANGE: 0
PALPITATIONS: 0
WHEEZING: 0
LIGHT-HEADEDNESS: 0
SLEEP DISTURBANCE: 0

## 2024-11-18 NOTE — PROGRESS NOTES
Subjective   Patient ID: Glenny Dunlap is a 84 y.o. female who presents for Follow-up (6 month follow up /Having pain in the groin area, says it hurts to sit sometimes. Takes tylenol for the pain, but it starts to hurt more when cleaning or doing activity ).  Right groin pain. Prior xray showed arthritis. Takes tylenol 2x daily which helps. Sometimes using heating pad. Not interested in doing anything else for this.     Eating well.     Some sinus congestion. Uses azelastine nasal spray occasionally for this.     Bowels doing well.     Declines all vaccines.     No other concerns today.         Review of Systems   Constitutional:  Negative for fatigue and unexpected weight change.   HENT:  Negative for congestion, ear pain and sinus pressure.    Eyes:  Negative for visual disturbance.   Respiratory:  Negative for chest tightness, shortness of breath and wheezing.    Cardiovascular:  Negative for chest pain, palpitations and leg swelling.   Gastrointestinal:  Negative for abdominal pain, constipation and diarrhea.   Genitourinary:  Negative for difficulty urinating.   Musculoskeletal:  Positive for arthralgias.   Skin:  Negative for rash.   Neurological:  Negative for dizziness, light-headedness and headaches.   Psychiatric/Behavioral:  Negative for dysphoric mood and sleep disturbance. The patient is not nervous/anxious.    All other systems reviewed and are negative.      Objective   Physical Exam  Vitals reviewed.   Constitutional:       General: She is not in acute distress.     Appearance: Normal appearance.   HENT:      Head: Normocephalic.   Eyes:      Pupils: Pupils are equal, round, and reactive to light.   Cardiovascular:      Rate and Rhythm: Normal rate and regular rhythm.   Pulmonary:      Effort: Pulmonary effort is normal. No respiratory distress.   Abdominal:      Palpations: Abdomen is soft.   Musculoskeletal:         General: Normal range of motion.   Skin:     General: Skin is warm and dry.    Neurological:      Mental Status: She is alert. Mental status is at baseline.   Psychiatric:         Mood and Affect: Mood normal.         Behavior: Behavior normal.         Body mass index is 21.68 kg/m².      Current Outpatient Medications:     amLODIPine (Norvasc) 5 mg tablet, Take 1 tablet (5 mg) by mouth once daily., Disp: 90 tablet, Rfl: 3    biotin 2,500 mcg capsule, Take by mouth., Disp: , Rfl:     calcium carbonate-vit D3-min 600 mg calcium- 400 unit tablet, Take by mouth., Disp: , Rfl:     cholecalciferol (Vitamin D-3) 50 MCG (2000 UT) tablet, Take 1 tablet (50 mcg) by mouth once daily., Disp: , Rfl:     diclofenac sodium (Voltaren) 1 % gel gel, Apply 1 Application topically 4 times a day as needed (joint pain)., Disp: 150 g, Rfl: 2    docusate sodium (Colace) 100 mg capsule, Take 1 capsule (100 mg) by mouth once daily., Disp: , Rfl:     ferrous sulfate 325 (65 Fe) MG tablet, Take by mouth., Disp: , Rfl:     fish oil concentrate (Omega-3) 120-180 mg capsule, Take by mouth., Disp: , Rfl:     hydrocortisone 1 % ointment, Apply topically 2 times a day., Disp: 28 g, Rfl: 3    latanoprost (Xalatan) 0.005 % ophthalmic solution, Administer into affected eye(s)., Disp: , Rfl:     multivit-iron-minerals-folic acid (Centrum Silver) 0.4 mg-300 mcg- 250 mcg tab, Take 1 tablet by mouth once daily., Disp: , Rfl:     polyethylene glycol (Glycolax) 17 gram/dose powder, Mix 17 g of powder and drink., Disp: , Rfl:     timolol (Timoptic) 0.5 % ophthalmic solution, Administer 1 drop into affected eye(s) 2 times a day., Disp: , Rfl:     azelastine (Astelin) 137 mcg (0.1 %) nasal spray, Administer 1 spray into each nostril 2 times a day. Use in each nostril as directed, Disp: 30 mL, Rfl: 1      Assessment/Plan   Diagnoses and all orders for this visit:  Right hip pain  Comments:  arthritis on prior xray  tylenol and heating pad managing her symptoms  call if worsening  Primary hypertension  Comments:  stable    Follow up in  4-6 months       Hannah Vale,  11/18/24 12:18 PM

## 2024-12-07 DIAGNOSIS — L20.89 OTHER ATOPIC DERMATITIS: ICD-10-CM

## 2024-12-07 RX ORDER — MAG HYDROX/ALUMINUM HYD/SIMETH 200-200-20
SUSPENSION, ORAL (FINAL DOSE FORM) ORAL 2 TIMES DAILY
Qty: 1 G | Refills: 11 | Status: SHIPPED | OUTPATIENT
Start: 2024-12-07

## 2025-05-19 ENCOUNTER — APPOINTMENT (OUTPATIENT)
Dept: PRIMARY CARE | Facility: CLINIC | Age: 85
End: 2025-05-19
Payer: MEDICARE

## 2025-05-27 ENCOUNTER — APPOINTMENT (OUTPATIENT)
Dept: PRIMARY CARE | Facility: CLINIC | Age: 85
End: 2025-05-27
Payer: MEDICARE

## 2025-05-27 VITALS
OXYGEN SATURATION: 96 % | HEART RATE: 70 BPM | WEIGHT: 113 LBS | DIASTOLIC BLOOD PRESSURE: 70 MMHG | BODY MASS INDEX: 22.19 KG/M2 | HEIGHT: 60 IN | SYSTOLIC BLOOD PRESSURE: 128 MMHG

## 2025-05-27 DIAGNOSIS — Z00.00 MEDICARE ANNUAL WELLNESS VISIT, SUBSEQUENT: Primary | ICD-10-CM

## 2025-05-27 DIAGNOSIS — H61.21 RIGHT EAR IMPACTED CERUMEN: ICD-10-CM

## 2025-05-27 DIAGNOSIS — H35.30 AGE-RELATED MACULAR DEGENERATION: ICD-10-CM

## 2025-05-27 DIAGNOSIS — M25.551 RIGHT HIP PAIN: ICD-10-CM

## 2025-05-27 DIAGNOSIS — I10 PRIMARY HYPERTENSION: ICD-10-CM

## 2025-05-27 PROCEDURE — 1036F TOBACCO NON-USER: CPT | Performed by: STUDENT IN AN ORGANIZED HEALTH CARE EDUCATION/TRAINING PROGRAM

## 2025-05-27 PROCEDURE — 1160F RVW MEDS BY RX/DR IN RCRD: CPT | Performed by: STUDENT IN AN ORGANIZED HEALTH CARE EDUCATION/TRAINING PROGRAM

## 2025-05-27 PROCEDURE — 69210 REMOVE IMPACTED EAR WAX UNI: CPT | Performed by: STUDENT IN AN ORGANIZED HEALTH CARE EDUCATION/TRAINING PROGRAM

## 2025-05-27 PROCEDURE — 1170F FXNL STATUS ASSESSED: CPT | Performed by: STUDENT IN AN ORGANIZED HEALTH CARE EDUCATION/TRAINING PROGRAM

## 2025-05-27 PROCEDURE — 1124F ACP DISCUSS-NO DSCNMKR DOCD: CPT | Performed by: STUDENT IN AN ORGANIZED HEALTH CARE EDUCATION/TRAINING PROGRAM

## 2025-05-27 PROCEDURE — 3078F DIAST BP <80 MM HG: CPT | Performed by: STUDENT IN AN ORGANIZED HEALTH CARE EDUCATION/TRAINING PROGRAM

## 2025-05-27 PROCEDURE — G0439 PPPS, SUBSEQ VISIT: HCPCS | Performed by: STUDENT IN AN ORGANIZED HEALTH CARE EDUCATION/TRAINING PROGRAM

## 2025-05-27 PROCEDURE — 99213 OFFICE O/P EST LOW 20 MIN: CPT | Performed by: STUDENT IN AN ORGANIZED HEALTH CARE EDUCATION/TRAINING PROGRAM

## 2025-05-27 PROCEDURE — 1159F MED LIST DOCD IN RCRD: CPT | Performed by: STUDENT IN AN ORGANIZED HEALTH CARE EDUCATION/TRAINING PROGRAM

## 2025-05-27 PROCEDURE — 3074F SYST BP LT 130 MM HG: CPT | Performed by: STUDENT IN AN ORGANIZED HEALTH CARE EDUCATION/TRAINING PROGRAM

## 2025-05-27 RX ORDER — DORZOLAMIDE HCL 20 MG/ML
1 SOLUTION/ DROPS OPHTHALMIC 2 TIMES DAILY
COMMUNITY
Start: 2025-05-08

## 2025-05-27 ASSESSMENT — PATIENT HEALTH QUESTIONNAIRE - PHQ9
SUM OF ALL RESPONSES TO PHQ9 QUESTIONS 1 AND 2: 0
2. FEELING DOWN, DEPRESSED OR HOPELESS: NOT AT ALL
1. LITTLE INTEREST OR PLEASURE IN DOING THINGS: NOT AT ALL
1. LITTLE INTEREST OR PLEASURE IN DOING THINGS: NOT AT ALL
2. FEELING DOWN, DEPRESSED OR HOPELESS: NOT AT ALL
2. FEELING DOWN, DEPRESSED OR HOPELESS: NOT AT ALL
1. LITTLE INTEREST OR PLEASURE IN DOING THINGS: NOT AT ALL
SUM OF ALL RESPONSES TO PHQ9 QUESTIONS 1 AND 2: 0
SUM OF ALL RESPONSES TO PHQ9 QUESTIONS 1 AND 2: 0

## 2025-05-27 ASSESSMENT — ACTIVITIES OF DAILY LIVING (ADL)
MANAGING_FINANCES: NEEDS ASSISTANCE
DOING_HOUSEWORK: NEEDS ASSISTANCE
DRESSING: INDEPENDENT
GROCERY_SHOPPING: NEEDS ASSISTANCE
BATHING: INDEPENDENT
TAKING_MEDICATION: INDEPENDENT

## 2025-05-27 NOTE — PROGRESS NOTES
Subjective   Patient ID: Glenny Dunlap is a 85 y.o. female who presents for Medicare Annual Wellness Visit Subsequent (Wellness visit /Sinuses and ears feel blocked up ).  History of Present Illness  The patient is an 85-year-old female who presents for her wellness visit. She also reports sinus and ears feeling blocked.    She has been diagnosed with age-related macular degeneration and was advised to take additional vitamins. However, she expresses concern about potential kidney complications associated with these vitamins, as informed by her dentist. She has not observed any changes in her vision, except during episodes of sinus congestion. She has been prescribed new eyedrops by her new eye doctor.    She reports experiencing sinus congestion, particularly when working outdoors, which also leads to a sensation of blocked ears. She has been using earwax removal drops but does not use any nasal sprays.    She reports no issues with bowel movements or urination. Her sleep pattern is normal, and she maintains adequate hydration. She has recently acquired new dentures, which fit well and have improved her ability to eat a variety of foods. She has reduced her ice cream intake, which has been beneficial.    She experiences arthritis pain in her right hip and buttock, which radiates to her waist upon exertion. She manages this with a heating pad and Tylenol. She continues to work and remains active.    Patient ID: Glenny Dunlap is a 85 y.o. female.    Ear Cerumen Removal    Date/Time: 5/27/2025 11:01 AM    Performed by: Sigifredo Mayberry MA  Authorized by: Hannah Vale DO    Consent:     Consent obtained:  Verbal    Consent given by:  Patient    Risks, benefits, and alternatives were discussed: yes      Risks discussed:  Pain and dizziness  Procedure details:     Location:  R ear    Procedure type: curette      Procedure outcomes: cerumen removed    Post-procedure details:     Inspection:  TM  intact    Procedure completion:  Tolerated well, no immediate complications      Review of Systems  ROS otherwise negative aside from what was mentioned above in HPI.    Objective     /70 (BP Location: Left arm, Patient Position: Sitting, BP Cuff Size: Adult)   Pulse 70   Ht 1.524 m (5')   Wt 51.3 kg (113 lb)   SpO2 96%   BMI 22.07 kg/m²      Current Outpatient Medications   Medication Instructions    amLODIPine (NORVASC) 5 mg, oral, Daily    azelastine (Astelin) 137 mcg (0.1 %) nasal spray 1 spray, Each Nostril, 2 times daily, Use in each nostril as directed    biotin 2,500 mcg capsule Take by mouth.    calcium carbonate-vit D3-min 600 mg calcium- 400 unit tablet Take by mouth.    cholecalciferol (VITAMIN D-3) 50 mcg, Daily    diclofenac sodium (Voltaren) 1 % gel gel 1 Application, Topical, 4 times daily PRN    docusate sodium (COLACE) 100 mg, Daily    dorzolamide (Trusopt) 2 % ophthalmic solution 1 drop, 2 times daily    ferrous sulfate 325 (65 Fe) MG tablet Take by mouth.    fish oil concentrate (Omega-3) 120-180 mg capsule Take by mouth.    hydrocortisone 1 % ointment Topical, 2 times daily    latanoprost (Xalatan) 0.005 % ophthalmic solution Administer into affected eye(s).    multivit-iron-minerals-folic acid (Centrum Silver) 0.4 mg-300 mcg- 250 mcg tab 1 tablet, Daily    polyethylene glycol (GLYCOLAX, MIRALAX) 17 g    timolol (Timoptic) 0.5 % ophthalmic solution 1 drop, 2 times daily       Physical Exam  Ears: Right ear appears full, left ear not blocked  Mouth/Throat: Teeth look good  Neck: Thyroid normal, no abnormalities  Respiratory: Clear to auscultation, no wheezing, rales or rhonchi  Cardiovascular: Regular rate and rhythm, no murmurs, rubs, or gallops  Gastrointestinal: Soft, no tenderness, no distention, no masses    Results      Assessment & Plan  1. Age-related macular degeneration.  - Recently diagnosed with age-related macular degeneration and concerned about potential side effects of  AREDS vitamins, particularly regarding kidney problems.  - Previous kidney function tests were normal.  - Advised to follow the eye doctor's recommendation to take AREDS vitamins. The cost of the vitamins was discussed, and it was noted that her insurance covers them.    2. Sinus congestion.  - Reports sinus congestion, especially when working outside, which also affects her ears.  - Physical examination revealed no significant blockage in the ears.  - Advised to try using nasal sprays to help alleviate the symptoms.    3. Earwax buildup.  - Reports feeling like her ears are blocked at times.  - Physical examination showed fullness in the right ear.  - Earwax removal will be performed today to address this issue.    4. Arthritis.  - Experiences arthritis pain in her right hip and buttock, which she manages with a heating pad and Tylenol.  - Advised to consider stretching exercises to help alleviate the pain.    5. Health maintenance.  - Blood pressure, heart rate, and weight are within normal ranges.  - Advised to receive the shingles vaccine at her local pharmacy.    Follow up annually or sooner as needed    Hannah Vale, DO     This medical note was created with the assistance of artificial intelligence (AI) for documentation purposes. The content has been reviewed and confirmed by the healthcare provider for accuracy and completeness. Patient consented to the use of audio recording and use of AI during their visit.

## 2025-07-08 ENCOUNTER — TELEPHONE (OUTPATIENT)
Dept: PRIMARY CARE | Facility: CLINIC | Age: 85
End: 2025-07-08
Payer: MEDICARE

## 2025-07-08 DIAGNOSIS — I10 PRIMARY HYPERTENSION: ICD-10-CM

## 2025-07-08 RX ORDER — AMLODIPINE BESYLATE 5 MG/1
5 TABLET ORAL DAILY
Qty: 90 TABLET | Refills: 3 | Status: SHIPPED | OUTPATIENT
Start: 2025-07-08

## 2025-12-01 ENCOUNTER — APPOINTMENT (OUTPATIENT)
Dept: PRIMARY CARE | Facility: CLINIC | Age: 85
End: 2025-12-01
Payer: MEDICARE